# Patient Record
Sex: MALE | Race: WHITE | NOT HISPANIC OR LATINO | Employment: FULL TIME | URBAN - METROPOLITAN AREA
[De-identification: names, ages, dates, MRNs, and addresses within clinical notes are randomized per-mention and may not be internally consistent; named-entity substitution may affect disease eponyms.]

---

## 2017-09-05 ENCOUNTER — OFFICE VISIT (OUTPATIENT)
Dept: UROLOGY | Facility: CLINIC | Age: 42
End: 2017-09-05
Payer: COMMERCIAL

## 2017-09-05 VITALS
DIASTOLIC BLOOD PRESSURE: 74 MMHG | HEART RATE: 101 BPM | HEIGHT: 71 IN | BODY MASS INDEX: 39.16 KG/M2 | SYSTOLIC BLOOD PRESSURE: 140 MMHG | WEIGHT: 279.75 LBS

## 2017-09-05 DIAGNOSIS — C60.9 PENILE CANCER: Primary | ICD-10-CM

## 2017-09-05 PROCEDURE — 3008F BODY MASS INDEX DOCD: CPT | Mod: S$GLB,,, | Performed by: UROLOGY

## 2017-09-05 PROCEDURE — 99999 PR PBB SHADOW E&M-NEW PATIENT-LVL III: CPT | Mod: PBBFAC,,, | Performed by: UROLOGY

## 2017-09-05 PROCEDURE — 99204 OFFICE O/P NEW MOD 45 MIN: CPT | Mod: S$GLB,,, | Performed by: UROLOGY

## 2017-09-05 RX ORDER — MULTIVITAMIN
1 TABLET ORAL DAILY
COMMUNITY

## 2017-09-05 NOTE — PROGRESS NOTES
"Clinic Note  9/5/2017      Subjective:         Chief Complaint:   HPI  Aakash Deshpadne is a 41 y.o. male recently diagnosed with penile cancer. Consult from dr. Funk.  Patient had circumcision and penile sparing resection on 8/23/2017 by Dr. Funk.  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 Surgical margin positive for dysplasia but negative for carcinoma. Stage T1a vs T2.  Here with his wife Maria Guadalupe. Works as a . 3 kids, 10,13,16.  Patient relates that he had a "wart" growing for a year and went to get it checked.      No past medical history on file.  No family history on file.  Social History     Social History    Marital status:      Spouse name: N/A    Number of children: N/A    Years of education: N/A     Occupational History    Not on file.     Social History Main Topics    Smoking status: Not on file    Smokeless tobacco: Not on file    Alcohol use Not on file    Drug use: Unknown    Sexual activity: Not on file     Other Topics Concern    Not on file     Social History Narrative    No narrative on file     No past surgical history on file.  Patient Active Problem List   Diagnosis    Penile cancer     Review of Systems   Constitutional: Negative for appetite change, chills, fatigue, fever and unexpected weight change.   HENT: Negative for nosebleeds.    Respiratory: Negative for shortness of breath and wheezing.    Cardiovascular: Negative for chest pain, palpitations and leg swelling.   Gastrointestinal: Negative for abdominal distention, abdominal pain, constipation, diarrhea, nausea and vomiting.   Genitourinary: Negative for difficulty urinating and hematuria.   Musculoskeletal: Negative for arthralgias and back pain.   Skin: Negative for pallor.   Neurological: Negative for dizziness, seizures and syncope.   Hematological: Negative for adenopathy.   Psychiatric/Behavioral: Negative for dysphoric mood.         Objective:      There were " no vitals taken for this visit.  There is no height or weight on file to calculate BMI.  Physical Exam   Constitutional: He is oriented to person, place, and time. He appears well-developed and well-nourished. No distress.   HENT:   Head: Atraumatic.   Neck: No tracheal deviation present.   Cardiovascular: Normal rate.    Pulmonary/Chest: Effort normal. No respiratory distress. He has no wheezes.   Abdominal: Soft. Bowel sounds are normal. He exhibits no distension and no mass. There is no tenderness. There is no rebound and no guarding.   Genitourinary:         Neurological: He is alert and oriented to person, place, and time.   Skin: Skin is warm and dry. He is not diaphoretic.     Psychiatric: He has a normal mood and affect. His behavior is normal. Judgment and thought content normal.         Assessment and Plan:           Problem List Items Addressed This Visit     Penile cancer - Primary      Other Visit Diagnoses    None.         Follow up:   Will need slides and blocks to better clarify his tumor stage and grade.  Discussed possible need for partial penectomy and  inguinal lymph node dissection.  Will need results from CXR and CT scan. I would like to get CT images to review.  Letter to Dr. Funk.    Gray Yousif

## 2017-09-07 PROCEDURE — 88321 CONSLTJ&REPRT SLD PREP ELSWR: CPT | Mod: ,,, | Performed by: PATHOLOGY

## 2017-09-13 ENCOUNTER — TELEPHONE (OUTPATIENT)
Dept: UROLOGY | Facility: CLINIC | Age: 42
End: 2017-09-13

## 2017-09-13 NOTE — TELEPHONE ENCOUNTER
----- Message from Gray Yousif MD sent at 9/13/2017  9:04 AM CDT -----  Katie    We have his path review completed. We still need his actual CT scan images to review and then we can have follow up visit.

## 2017-09-13 NOTE — TELEPHONE ENCOUNTER
I spoke with patient who agreed to appt date and time, patient said he will bring ct disc to his appt.

## 2017-09-19 ENCOUNTER — OFFICE VISIT (OUTPATIENT)
Dept: UROLOGY | Facility: CLINIC | Age: 42
End: 2017-09-19
Payer: COMMERCIAL

## 2017-09-19 VITALS
BODY MASS INDEX: 39.76 KG/M2 | WEIGHT: 284 LBS | DIASTOLIC BLOOD PRESSURE: 73 MMHG | HEIGHT: 71 IN | SYSTOLIC BLOOD PRESSURE: 144 MMHG | RESPIRATION RATE: 15 BRPM | HEART RATE: 88 BPM

## 2017-09-19 DIAGNOSIS — C60.9 PENILE CANCER: Primary | ICD-10-CM

## 2017-09-19 PROCEDURE — 3008F BODY MASS INDEX DOCD: CPT | Mod: S$GLB,,, | Performed by: UROLOGY

## 2017-09-19 PROCEDURE — 99214 OFFICE O/P EST MOD 30 MIN: CPT | Mod: S$GLB,,, | Performed by: UROLOGY

## 2017-09-19 PROCEDURE — 99999 PR PBB SHADOW E&M-EST. PATIENT-LVL III: CPT | Mod: PBBFAC,,, | Performed by: UROLOGY

## 2017-09-19 NOTE — LETTER
September 19, 2017        Juan Funk MD  [address]  [place] [address]             [facility]  [address]  [place] [address]  Phone: [phone]   Patient: Aakash Deshpande   MR Number: [id number]   YOB: 1975   Date of Visit: 9/19/2017       Dear Dr. Funk:    Thank you for referring Aakash Deshpande to me for evaluation. Attached you will find relevant portions of my assessment and plan of care.    If you have questions, please do not hesitate to call me. I look forward to following Aakash Deshpande along with you.    Sincerely,      Gray Yousif MD            CC  No Recipients    Enclosure

## 2017-09-19 NOTE — PROGRESS NOTES
"Clinic Note  9/19/2017      Subjective:         Chief Complaint:   HPI  Aakash Deshpande is a 41 y.o. male recently diagnosed with penile cancer. Consult from dr. Funk.  Patient had circumcision and penile sparing resection on 8/23/2017 by Dr. Funk.  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 Surgical margin positive for dysplasia but negative for carcinoma. Stage T1a vs T2.  Here with his wife Maria Guadalupe. Works as a . 3 kids, 10,13,16.  Patient relates that he had a "wart" growing for a year and went to get it checked.  Reviewed CT scan from 8/23/2017. No inguinal or pelvic lymphadenopathy.  [facility] slides [facility] multidisciplinary conference.  Stage T1a moderate differentiated 2/4, no lvi.       No past medical history on file.  Family History   Problem Relation Age of Onset    Hypertension Father     Diabetes Mother     Thyroid nodules Mother      Social History     Social History    Marital status:      Spouse name: N/A    Number of children: N/A    Years of education: N/A     Occupational History    Not on file.     Social History Main Topics    Smoking status: Former Smoker    Smokeless tobacco: Current User     Types: Chew    Alcohol use Yes    Drug use: No    Sexual activity: Not on file     Other Topics Concern    Not on file     Social History Narrative    No narrative on file     Past Surgical History:   Procedure Laterality Date    BACK SURGERY  03/2016     Patient Active Problem List   Diagnosis    Penile cancer     Review of Systems   Constitutional: Negative for appetite change, chills, fatigue, fever and unexpected weight change.   HENT: Negative for nosebleeds.    Respiratory: Negative for shortness of breath and wheezing.    Cardiovascular: Negative for chest pain, palpitations and leg swelling.   Gastrointestinal: Negative for abdominal distention, abdominal pain, constipation, diarrhea, nausea and vomiting. " "  Genitourinary: Negative for difficulty urinating.   Musculoskeletal: Negative for arthralgias and back pain.   Skin: Negative for pallor.   Neurological: Negative for dizziness, seizures and syncope.   Hematological: Negative for adenopathy.   Psychiatric/Behavioral: Negative for dysphoric mood.         Objective:      BP (!) 144/73   Pulse 88   Resp 15   Ht 5' 11" (1.803 m)   Wt 128.8 kg (284 lb)   BMI 39.61 kg/m²   Estimated body mass index is 39.61 kg/m² as calculated from the following:    Height as of this encounter: 5' 11" (1.803 m).    Weight as of this encounter: 128.8 kg (284 lb).  Physical Exam   Constitutional: He is oriented to person, place, and time. He appears well-developed and well-nourished. No distress.   HENT:   Head: Atraumatic.   Neck: No tracheal deviation present.   Cardiovascular: Normal rate.    Pulmonary/Chest: Effort normal. No respiratory distress. He has no wheezes.   Abdominal: Soft. Bowel sounds are normal. He exhibits no distension and no mass. There is no tenderness. There is no rebound and no guarding.   Genitourinary: Circumcised.   Genitourinary Comments: No inguinal nodes, no suspicious lesions on penis.   Neurological: He is alert and oriented to person, place, and time.   Skin: Skin is warm and dry. He is not diaphoretic.     Psychiatric: He has a normal mood and affect. His behavior is normal. Judgment and thought content normal.         Assessment and Plan:           Problem List Items Addressed This Visit     Penile cancer - Primary      Other Visit Diagnoses    None.         Follow up:   Discussed options. NCCN guidelines- close surveillance with physical exams .  Recommend repeat physical exam in 2 months with Dr. Funk. Then every 3 months for 2 years. Due to his obesity will need CT scan to assess nodes.  Letter to Dr. Funk.      Gray Yousif      "

## 2017-12-14 ENCOUNTER — TELEPHONE (OUTPATIENT)
Dept: UROLOGY | Facility: CLINIC | Age: 42
End: 2017-12-14

## 2017-12-20 DIAGNOSIS — C60.9 PENILE CANCER: Primary | ICD-10-CM

## 2018-01-09 ENCOUNTER — OFFICE VISIT (OUTPATIENT)
Dept: UROLOGY | Facility: CLINIC | Age: 43
End: 2018-01-09
Payer: COMMERCIAL

## 2018-01-09 ENCOUNTER — HOSPITAL ENCOUNTER (OUTPATIENT)
Dept: RADIOLOGY | Facility: HOSPITAL | Age: 43
Discharge: HOME | End: 2018-01-09
Attending: UROLOGY
Payer: COMMERCIAL

## 2018-01-09 VITALS
SYSTOLIC BLOOD PRESSURE: 137 MMHG | WEIGHT: 284 LBS | HEART RATE: 87 BPM | DIASTOLIC BLOOD PRESSURE: 71 MMHG | RESPIRATION RATE: 15 BRPM | BODY MASS INDEX: 39.76 KG/M2 | HEIGHT: 71 IN

## 2018-01-09 DIAGNOSIS — C60.9 PENILE CANCER: Primary | ICD-10-CM

## 2018-01-09 DIAGNOSIS — C60.9 PENILE CANCER: ICD-10-CM

## 2018-01-09 PROCEDURE — 74178 CT ABD&PLV WO CNTR FLWD CNTR: CPT | Mod: 26,,, | Performed by: RADIOLOGY

## 2018-01-09 PROCEDURE — 99999 PR PBB SHADOW E&M-EST. PATIENT-LVL III: CPT | Mod: PBBFAC,,, | Performed by: UROLOGY

## 2018-01-09 PROCEDURE — 99214 OFFICE O/P EST MOD 30 MIN: CPT | Mod: S$GLB,,, | Performed by: UROLOGY

## 2018-01-09 PROCEDURE — 74178 CT ABD&PLV WO CNTR FLWD CNTR: CPT | Mod: TC

## 2018-01-09 PROCEDURE — 25500020 PHARM REV CODE 255: Performed by: UROLOGY

## 2018-01-09 RX ADMIN — IOHEXOL 100 ML: 350 INJECTION, SOLUTION INTRAVENOUS at 10:01

## 2018-01-09 RX ADMIN — IOHEXOL 15 ML: 350 INJECTION, SOLUTION INTRAVENOUS at 08:01

## 2018-01-09 NOTE — PROGRESS NOTES
"Clinic Note  1/9/2018      Subjective:         Chief Complaint:   HPI  Aakash Deshpande is a 42 y.o. male recently diagnosed with penile cancer. Consult from dr. Funk.  Patient had circumcision and penile sparing resection on 8/23/2017 by Dr. Funk.  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 Surgical margin positive for dysplasia but negative for carcinoma. Stage T1a vs T2.  Here with his wife Maria Guadalupe. Works as a . 3 kids, 10,13,16.  Patient relates that he had a "wart" growing for a year and went to get it checked.  Reviewed CT scan from 8/23/2017. No inguinal or pelvic lymphadenopathy.  Path slides reviewed at [facility] multidisciplinary conference.  Stage T1a moderate differentiated 2/4, no lvi.    CT scan today- no inguinal or pelvic nodes to my review or radiology review.    No past medical history on file.  Family History   Problem Relation Age of Onset    Hypertension Father     Diabetes Mother     Thyroid nodules Mother      Social History     Social History    Marital status:      Spouse name: N/A    Number of children: N/A    Years of education: N/A     Occupational History    Not on file.     Social History Main Topics    Smoking status: Former Smoker    Smokeless tobacco: Current User     Types: Chew    Alcohol use Yes    Drug use: No    Sexual activity: Not on file     Other Topics Concern    Not on file     Social History Narrative    No narrative on file     Past Surgical History:   Procedure Laterality Date    BACK SURGERY  03/2016     Patient Active Problem List   Diagnosis    Penile cancer     Review of Systems   Constitutional: Negative for appetite change, chills, fatigue, fever and unexpected weight change.   HENT: Negative for nosebleeds.    Respiratory: Negative for shortness of breath and wheezing.    Cardiovascular: Negative for chest pain, palpitations and leg swelling.   Gastrointestinal: Negative for abdominal " "distention, abdominal pain, constipation, diarrhea, nausea and vomiting.   Genitourinary: Negative for dysuria and hematuria.   Musculoskeletal: Negative for arthralgias and back pain.   Skin: Negative for pallor.   Neurological: Negative for dizziness, seizures and syncope.   Hematological: Negative for adenopathy.   Psychiatric/Behavioral: Negative for dysphoric mood.         Objective:      /71   Pulse 87   Resp 15   Ht 5' 11" (1.803 m)   Wt 128.8 kg (284 lb)   BMI 39.61 kg/m²   Estimated body mass index is 39.61 kg/m² as calculated from the following:    Height as of this encounter: 5' 11" (1.803 m).    Weight as of this encounter: 128.8 kg (284 lb).  Physical Exam   Constitutional: He is oriented to person, place, and time. He appears well-developed and well-nourished. No distress.   HENT:   Head: Atraumatic.   Neck: No tracheal deviation present.   Cardiovascular: Normal rate.    Pulmonary/Chest: Effort normal. No respiratory distress. He has no wheezes.   Abdominal: Soft. Bowel sounds are normal. He exhibits no distension and no mass. There is no tenderness. There is no rebound and no guarding.   Genitourinary:         Lymphadenopathy: No inguinal adenopathy noted on the right or left side.   Neurological: He is alert and oriented to person, place, and time.   Skin: Skin is warm and dry. He is not diaphoretic.     Psychiatric: He has a normal mood and affect. His behavior is normal. Judgment and thought content normal.         Assessment and Plan:   Negative nodes on CT and physical exam. Will schedule biopsy of penile lesion under anesthetic.        Problem List Items Addressed This Visit     Penile cancer - Primary          Follow up:       Gray Yousif      "

## 2018-01-22 ENCOUNTER — TELEPHONE (OUTPATIENT)
Dept: UROLOGY | Facility: CLINIC | Age: 43
End: 2018-01-22

## 2018-01-22 DIAGNOSIS — C60.9 PENILE CANCER: Primary | ICD-10-CM

## 2018-01-25 ENCOUNTER — ANESTHESIA EVENT (OUTPATIENT)
Dept: SURGERY | Facility: HOSPITAL | Age: 43
End: 2018-01-25
Payer: COMMERCIAL

## 2018-01-25 NOTE — ANESTHESIA PREPROCEDURE EVALUATION
Anesthesia Assessment: Preoperative EQUATION     Planned Procedure: Procedure(s) (LRB):  BIOPSY-PENIS (N/A)  Requested Anesthesia Type:Local MAC  Surgeon: Gray Yousif MD  Service: Urology  Known or anticipated Date of Surgery:2/5/2018     Surgeon notes: reviewed     Electronic QUestionnaire Assessment completed via nurse interview with patient.      NO AQ     Triage considerations:      The patient has no apparent active cardiac condition (No unstable coronary Syndrome such as severe unstable angina or recent [<1 month] myocardial infarction, decompensated CHF, severe valvular   disease or significant arrhythmia)     Previous anesthesia records:GETA, No problems and Not available     Other important co-morbidities:  HX Back surgery, Chews tobacco     Tests already available:  Available tests,  within 1 month , within [facility] .  1/2018 CMP                            Instructions given. (See in Nurse's note)     Optimization:  Anesthesia Preop Clinic Assessment  Indicated-not required for this surgery                Plan:    Testing:  none needed                           Patient  has previously scheduled Medical Appointment: none     Navigation: Tests Scheduled. none                        Straight Line to surgery.                          No tests, anesthesia preop clinic visit, or consult required.      Kelsey Weeks RN                                                 Electronically signed by Kelsey Weeks RN at 1/25/2018 12:56 PM                                                                                                                   01/25/2018  Aakash Deshpande is a 42 y.o., male.    Anesthesia Evaluation    I have reviewed the Patient Summary Reports.    I have reviewed the Nursing Notes.   I have reviewed the Medications.     Review of Systems  Anesthesia Hx:  No problems with previous Anesthesia  History of prior surgery of interest to airway management or planning: Denies Family Hx of  Anesthesia complications.   Denies Personal Hx of Anesthesia complications.   Social:  Chews tobacco   Hematology/Oncology:  Hematology Normal   Oncology Normal     EENT/Dental:EENT/Dental Normal   Cardiovascular:  Cardiovascular Normal     Pulmonary:   Snores, has never been evaluated for sleep apnea   Hepatic/GI:  Hepatic/GI Normal    Neurological:  Neurology Normal    Endocrine:  Endocrine Normal        Physical Exam  General:  Well nourished, Morbid Obesity    Airway/Jaw/Neck:  Airway Findings: Mouth Opening: < 3 cm Tongue: Large  General Airway Assessment: Adult, Possible difficult intubation  Mallampati: IV  Improves to III with phonation.  TM Distance: 4 - 6 cm  Jaw/Neck Findings:  Neck ROM: Normal ROM      Dental:  Dental Findings: In tact   Chest/Lungs:  Chest/Lungs Findings: Clear to auscultation, Normal Respiratory Rate     Heart/Vascular:  Heart Findings: Rate: Normal  Rhythm: Regular Rhythm  Sounds: Normal        Mental Status:  Mental Status Findings:  Cooperative, Alert and Oriented         Anesthesia Plan  Type of Anesthesia, risks & benefits discussed:  Anesthesia Type:  general  Patient's Preference:   Intra-op Monitoring Plan:   Intra-op Monitoring Plan Comments:   Post Op Pain Control Plan:   Post Op Pain Control Plan Comments:   Induction:   IV  Beta Blocker:  Patient is not currently on a Beta-Blocker (No further documentation required).       Informed Consent: Patient understands risks and agrees with Anesthesia plan.  Questions answered. Anesthesia consent signed with patient.  ASA Score: 3     Day of Surgery Review of History & Physical:    H&P update referred to the surgeon.         Ready For Surgery From Anesthesia Perspective.

## 2018-01-25 NOTE — PRE ADMISSION SCREENING
Anesthesia Assessment: Preoperative EQUATION    Planned Procedure: Procedure(s) (LRB):  BIOPSY-PENIS (N/A)  Requested Anesthesia Type:Local MAC  Surgeon: Gray Yousif MD  Service: Urology  Known or anticipated Date of Surgery:2/5/2018    Surgeon notes: reviewed    Electronic QUestionnaire Assessment completed via nurse interview with patient.      NO AQ    Triage considerations:     The patient has no apparent active cardiac condition (No unstable coronary Syndrome such as severe unstable angina or recent [<1 month] myocardial infarction, decompensated CHF, severe valvular   disease or significant arrhythmia)    Previous anesthesia records:GETA, No problems and Not available    Other important co-morbidities:  HX Back surgery, Chews tobacco     Tests already available:  Available tests,  within 1 month , [facility] .  1/2018 CMP            Instructions given. (See in Nurse's note)    Optimization:  Anesthesia Preop Clinic Assessment  Indicated-not required for this surgery        Plan:    Testing:  none needed     Patient  has previously scheduled Medical Appointment: none    Navigation: Tests Scheduled. none             Straight Line to surgery.               No tests, anesthesia preop clinic visit, or consult required.     Kelsey Weeks RN

## 2018-02-02 ENCOUNTER — TELEPHONE (OUTPATIENT)
Dept: UROLOGY | Facility: CLINIC | Age: 43
End: 2018-02-02

## 2018-02-05 ENCOUNTER — ANESTHESIA (OUTPATIENT)
Dept: SURGERY | Facility: HOSPITAL | Age: 43
End: 2018-02-05
Payer: COMMERCIAL

## 2018-02-05 ENCOUNTER — HOSPITAL ENCOUNTER (OUTPATIENT)
Facility: HOSPITAL | Age: 43
Discharge: HOME | End: 2018-02-05
Attending: UROLOGY | Admitting: UROLOGY
Payer: COMMERCIAL

## 2018-02-05 VITALS
TEMPERATURE: 98 F | HEART RATE: 77 BPM | BODY MASS INDEX: 38.19 KG/M2 | SYSTOLIC BLOOD PRESSURE: 113 MMHG | OXYGEN SATURATION: 98 % | RESPIRATION RATE: 16 BRPM | HEIGHT: 72 IN | DIASTOLIC BLOOD PRESSURE: 69 MMHG | WEIGHT: 282 LBS

## 2018-02-05 DIAGNOSIS — C60.9 PENILE CANCER: ICD-10-CM

## 2018-02-05 PROCEDURE — 37000008 HC ANESTHESIA 1ST 15 MINUTES: Performed by: UROLOGY

## 2018-02-05 PROCEDURE — 71000016 HC POSTOP RECOV ADDL HR: Performed by: UROLOGY

## 2018-02-05 PROCEDURE — 71000033 HC RECOVERY, INTIAL HOUR: Performed by: UROLOGY

## 2018-02-05 PROCEDURE — 36000707: Performed by: UROLOGY

## 2018-02-05 PROCEDURE — 25000003 PHARM REV CODE 250: Performed by: NURSE ANESTHETIST, CERTIFIED REGISTERED

## 2018-02-05 PROCEDURE — 88305 TISSUE EXAM BY PATHOLOGIST: CPT | Mod: 26,,, | Performed by: PATHOLOGY

## 2018-02-05 PROCEDURE — 54100 BIOPSY OF PENIS: CPT | Mod: ,,, | Performed by: UROLOGY

## 2018-02-05 PROCEDURE — D9220A PRA ANESTHESIA: Mod: CRNA,,, | Performed by: NURSE ANESTHETIST, CERTIFIED REGISTERED

## 2018-02-05 PROCEDURE — 63600175 PHARM REV CODE 636 W HCPCS: Performed by: NURSE ANESTHETIST, CERTIFIED REGISTERED

## 2018-02-05 PROCEDURE — 71000015 HC POSTOP RECOV 1ST HR: Performed by: UROLOGY

## 2018-02-05 PROCEDURE — D9220A PRA ANESTHESIA: Mod: ANES,,, | Performed by: ANESTHESIOLOGY

## 2018-02-05 PROCEDURE — 63600175 PHARM REV CODE 636 W HCPCS: Performed by: STUDENT IN AN ORGANIZED HEALTH CARE EDUCATION/TRAINING PROGRAM

## 2018-02-05 PROCEDURE — 25000003 PHARM REV CODE 250: Performed by: UROLOGY

## 2018-02-05 PROCEDURE — 37000009 HC ANESTHESIA EA ADD 15 MINS: Performed by: UROLOGY

## 2018-02-05 PROCEDURE — 36000706: Performed by: UROLOGY

## 2018-02-05 PROCEDURE — 25000003 PHARM REV CODE 250: Performed by: EMERGENCY MEDICINE

## 2018-02-05 PROCEDURE — 88305 TISSUE EXAM BY PATHOLOGIST: CPT | Performed by: PATHOLOGY

## 2018-02-05 RX ORDER — MIDAZOLAM HYDROCHLORIDE 1 MG/ML
INJECTION, SOLUTION INTRAMUSCULAR; INTRAVENOUS
Status: DISCONTINUED | OUTPATIENT
Start: 2018-02-05 | End: 2018-02-05

## 2018-02-05 RX ORDER — SODIUM CHLORIDE 0.9 % (FLUSH) 0.9 %
3 SYRINGE (ML) INJECTION
Status: DISCONTINUED | OUTPATIENT
Start: 2018-02-05 | End: 2018-02-05 | Stop reason: HOSPADM

## 2018-02-05 RX ORDER — HYDROCODONE BITARTRATE AND ACETAMINOPHEN 5; 325 MG/1; MG/1
1 TABLET ORAL EVERY 6 HOURS PRN
Status: DISCONTINUED | OUTPATIENT
Start: 2018-02-05 | End: 2018-02-05 | Stop reason: HOSPADM

## 2018-02-05 RX ORDER — FENTANYL CITRATE 50 UG/ML
25 INJECTION, SOLUTION INTRAMUSCULAR; INTRAVENOUS EVERY 5 MIN PRN
Status: DISCONTINUED | OUTPATIENT
Start: 2018-02-05 | End: 2018-02-05 | Stop reason: HOSPADM

## 2018-02-05 RX ORDER — HYDROCODONE BITARTRATE AND ACETAMINOPHEN 5; 325 MG/1; MG/1
TABLET ORAL
Status: DISCONTINUED
Start: 2018-02-05 | End: 2018-02-05 | Stop reason: HOSPADM

## 2018-02-05 RX ORDER — PROPOFOL 10 MG/ML
VIAL (ML) INTRAVENOUS CONTINUOUS PRN
Status: DISCONTINUED | OUTPATIENT
Start: 2018-02-05 | End: 2018-02-05

## 2018-02-05 RX ORDER — HYDROCODONE BITARTRATE AND ACETAMINOPHEN 5; 325 MG/1; MG/1
1 TABLET ORAL EVERY 6 HOURS PRN
Qty: 31 TABLET | Refills: 0 | Status: SHIPPED | OUTPATIENT
Start: 2018-02-05 | End: 2018-02-15

## 2018-02-05 RX ORDER — POLYETHYLENE GLYCOL 3350 17 G/17G
17 POWDER, FOR SOLUTION ORAL DAILY
Qty: 30 PACKET | Refills: 0 | Status: SHIPPED | OUTPATIENT
Start: 2018-02-05 | End: 2018-05-01

## 2018-02-05 RX ORDER — LIDOCAINE HYDROCHLORIDE 10 MG/ML
INJECTION INFILTRATION; PERINEURAL
Status: DISCONTINUED
Start: 2018-02-05 | End: 2018-02-05 | Stop reason: HOSPADM

## 2018-02-05 RX ORDER — CEFAZOLIN SODIUM 1 G/3ML
2 INJECTION, POWDER, FOR SOLUTION INTRAMUSCULAR; INTRAVENOUS
Status: COMPLETED | OUTPATIENT
Start: 2018-02-05 | End: 2018-02-05

## 2018-02-05 RX ORDER — SODIUM CHLORIDE 9 MG/ML
INJECTION, SOLUTION INTRAVENOUS CONTINUOUS
Status: DISCONTINUED | OUTPATIENT
Start: 2018-02-05 | End: 2018-02-05 | Stop reason: HOSPADM

## 2018-02-05 RX ORDER — SODIUM CHLORIDE 9 MG/ML
INJECTION, SOLUTION INTRAVENOUS CONTINUOUS PRN
Status: DISCONTINUED | OUTPATIENT
Start: 2018-02-05 | End: 2018-02-05

## 2018-02-05 RX ORDER — LIDOCAINE HYDROCHLORIDE 10 MG/ML
INJECTION, SOLUTION INTRAVENOUS
Status: DISCONTINUED | OUTPATIENT
Start: 2018-02-05 | End: 2018-02-05

## 2018-02-05 RX ORDER — OXYCODONE AND ACETAMINOPHEN 5; 325 MG/1; MG/1
1 TABLET ORAL EVERY 4 HOURS PRN
Qty: 31 TABLET | Refills: 0 | Status: SHIPPED | OUTPATIENT
Start: 2018-02-05 | End: 2018-02-27

## 2018-02-05 RX ORDER — FENTANYL CITRATE 50 UG/ML
INJECTION, SOLUTION INTRAMUSCULAR; INTRAVENOUS
Status: DISCONTINUED | OUTPATIENT
Start: 2018-02-05 | End: 2018-02-05

## 2018-02-05 RX ORDER — LIDOCAINE HYDROCHLORIDE 10 MG/ML
INJECTION, SOLUTION EPIDURAL; INFILTRATION; INTRACAUDAL; PERINEURAL
Status: DISCONTINUED | OUTPATIENT
Start: 2018-02-05 | End: 2018-02-05 | Stop reason: HOSPADM

## 2018-02-05 RX ADMIN — CEFAZOLIN 2 G: 330 INJECTION, POWDER, FOR SOLUTION INTRAMUSCULAR; INTRAVENOUS at 11:02

## 2018-02-05 RX ADMIN — FENTANYL CITRATE 50 MCG: 50 INJECTION, SOLUTION INTRAMUSCULAR; INTRAVENOUS at 11:02

## 2018-02-05 RX ADMIN — MIDAZOLAM HYDROCHLORIDE 2 MG: 1 INJECTION, SOLUTION INTRAMUSCULAR; INTRAVENOUS at 11:02

## 2018-02-05 RX ADMIN — LIDOCAINE HYDROCHLORIDE 100 MG: 10 INJECTION, SOLUTION INTRAVENOUS at 11:02

## 2018-02-05 RX ADMIN — HYDROCODONE BITARTRATE AND ACETAMINOPHEN 1 TABLET: 5; 325 TABLET ORAL at 12:02

## 2018-02-05 RX ADMIN — PROPOFOL 175 MCG/KG/MIN: 10 INJECTION, EMULSION INTRAVENOUS at 11:02

## 2018-02-05 RX ADMIN — SODIUM CHLORIDE: 0.9 INJECTION, SOLUTION INTRAVENOUS at 11:02

## 2018-02-05 NOTE — INTERVAL H&P NOTE
The patient has been examined and the H&P has been reviewed:    I concur with the findings and no changes have occurred since H&P was written.    Anesthesia/Surgery risks, benefits and alternative options discussed and understood by patient/family.          Active Hospital Problems    Diagnosis  POA    Penile cancer [C60.9]  Yes      Resolved Hospital Problems    Diagnosis Date Resolved POA   No resolved problems to display.

## 2018-02-05 NOTE — TRANSFER OF CARE
"Anesthesia Transfer of Care Note    Patient: Aakash Deshpande    Procedure(s) Performed: Procedure(s) (LRB):  BIOPSY-PENIS (N/A)    Patient location: PACU    Anesthesia Type: MAC    Transport from OR: Transported from OR on room air with adequate spontaneous ventilation. Transported from OR on 6-10 L/min O2 by face mask with adequate spontaneous ventilation    Post pain: adequate analgesia    Post assessment: no apparent anesthetic complications and tolerated procedure well    Post vital signs: stable    Level of consciousness: awake and alert    Nausea/Vomiting: no nausea/vomiting    Complications: none    Transfer of care protocol was followed      Last vitals:   Visit Vitals  /73 (BP Location: Right arm, Patient Position: Lying)   Pulse 75   Temp 36.7 °C (98 °F) (Oral)   Resp 20   Ht 5' 11.5" (1.816 m)   Wt 127.9 kg (282 lb)   SpO2 98%   BMI 38.78 kg/m²     "

## 2018-02-05 NOTE — BRIEF OP NOTE
[facility]  Brief Operative Note     SUMMARY     Surgery Date: 2/5/2018     Surgeon(s) and Role:     * Gray Yousif MD - Primary     * Gray Haynes MD - Resident - Assisting        Pre-op Diagnosis:  Penile cancer [C60.9]    Post-op Diagnosis:  Post-Op Diagnosis Codes:     * Penile cancer [C60.9]    Procedure(s) (LRB):  BIOPSY-PENIS (N/A)    Anesthesia: Local MAC    Description of the findings of the procedure: see op note    Findings/Key Components: see op note    Estimated Blood Loss: * No values recorded between 2/5/2018 11:33 AM and 2/5/2018 11:53 AM *         Specimens:   Specimen (12h ago through future)    Start     Ordered    02/05/18 1144  Specimen to Pathology - Surgery  Once     Comments:  1. Penile biopsy -- silk right, chromic left -- Perm.      02/05/18 1143          Discharge Note    SUMMARY     Admit Date: 2/5/2018    Discharge Date and Time:  02/05/2018 11:58 AM    Hospital Course (synopsis of major diagnoses, care, treatment, and services provided during the course of the hospital stay): Patient admitted for penile biopsy. Tolerated the procedure well. Discharged home in stable condition.      Final Diagnosis: Post-Op Diagnosis Codes:     * Penile cancer [C60.9]    Disposition: Home or Self Care    Follow Up/Patient Instructions:     Medications:  Reconciled Home Medications:   Current Discharge Medication List      START taking these medications    Details   oxyCODONE-acetaminophen (PERCOCET) 5-325 mg per tablet Take 1 tablet by mouth every 4 (four) hours as needed.  Qty: 31 tablet, Refills: 0      polyethylene glycol (GLYCOLAX) 17 gram PwPk Take 17 g by mouth once daily.  Qty: 30 packet, Refills: 0         CONTINUE these medications which have NOT CHANGED    Details   multivitamin (ONE DAILY MULTIVITAMIN) per tablet Take 1 tablet by mouth once daily.             Discharge Procedure Orders  Diet Adult Regular     Other restrictions (specify):   Order Comments:  Refrain from sexual intercourse or masturbation for 6 weeks.     Notify your health care provider if you experience any of the following:  temperature >100.4     Notify your health care provider if you experience any of the following:  persistent nausea and vomiting or diarrhea     Notify your health care provider if you experience any of the following:  severe uncontrolled pain     Notify your health care provider if you experience any of the following:  redness, tenderness, or signs of infection (pain, swelling, redness, odor or green/yellow discharge around incision site)     Notify your health care provider if you experience any of the following:  difficulty breathing or increased cough     Notify your health care provider if you experience any of the following:  severe persistent headache     Notify your health care provider if you experience any of the following:  worsening rash     Notify your health care provider if you experience any of the following:  persistent dizziness, light-headedness, or visual disturbances     Notify your health care provider if you experience any of the following:  increased confusion or weakness     No dressing needed   Order Comments: May shower. Do not soak wounds in water or take a bath for at least 2 weeks.       Follow-up Information     Gray Yousif MD In 2 weeks.    Specialty:  Urology  Why:  post op follow up.   Contact information:  [address]  [facility] [id number] [phone]

## 2018-02-05 NOTE — DISCHARGE SUMMARY
[facility]  Discharge Note  Short Stay    Admit Date: 2/5/2018    Discharge Date and Time: 2/5/18     Attending Physician: Gray Yousif MD     Discharge Provider: Gray Haynes    Diagnoses:  Active Hospital Problems    Diagnosis  POA    *Penile cancer [C60.9]  Yes      Resolved Hospital Problems    Diagnosis Date Resolved POA   No resolved problems to display.       Discharged Condition: good    Hospital Course: Patient was admitted for an outpatient procedure and tolerated the procedure well with no complications.    Final Diagnoses: Same as principal problem.    Disposition: Home or Self Care    Follow up/Patient Instructions:    Medications:  Reconciled Home Medications:   Current Discharge Medication List      START taking these medications    Details   oxyCODONE-acetaminophen (PERCOCET) 5-325 mg per tablet Take 1 tablet by mouth every 4 (four) hours as needed.  Qty: 31 tablet, Refills: 0      polyethylene glycol (GLYCOLAX) 17 gram PwPk Take 17 g by mouth once daily.  Qty: 30 packet, Refills: 0         CONTINUE these medications which have NOT CHANGED    Details   multivitamin (ONE DAILY MULTIVITAMIN) per tablet Take 1 tablet by mouth once daily.             Discharge Procedure Orders  Diet Adult Regular     Other restrictions (specify):   Order Comments: Refrain from sexual intercourse or masturbation for 6 weeks.     Notify your health care provider if you experience any of the following:  temperature >100.4     Notify your health care provider if you experience any of the following:  persistent nausea and vomiting or diarrhea     Notify your health care provider if you experience any of the following:  severe uncontrolled pain     Notify your health care provider if you experience any of the following:  redness, tenderness, or signs of infection (pain, swelling, redness, odor or green/yellow discharge around incision site)     Notify your health care provider if you experience any of  the following:  difficulty breathing or increased cough     Notify your health care provider if you experience any of the following:  severe persistent headache     Notify your health care provider if you experience any of the following:  worsening rash     Notify your health care provider if you experience any of the following:  persistent dizziness, light-headedness, or visual disturbances     Notify your health care provider if you experience any of the following:  increased confusion or weakness     No dressing needed   Order Comments: May shower. Do not soak wounds in water or take a bath for at least 2 weeks.       Follow-up Information     Gray Yousif MD In 2 weeks.    Specialty:  Urology  Why:  post op follow up.   Contact information:  [address]  [facility] [address]  [phone]                   Discharge Procedure Orders (must include Diet, Follow-up, Activity):    Discharge Procedure Orders (must include Diet, Follow-up, Activity)  Diet Adult Regular     Other restrictions (specify):   Order Comments: Refrain from sexual intercourse or masturbation for 6 weeks.     Notify your health care provider if you experience any of the following:  temperature >100.4     Notify your health care provider if you experience any of the following:  persistent nausea and vomiting or diarrhea     Notify your health care provider if you experience any of the following:  severe uncontrolled pain     Notify your health care provider if you experience any of the following:  redness, tenderness, or signs of infection (pain, swelling, redness, odor or green/yellow discharge around incision site)     Notify your health care provider if you experience any of the following:  difficulty breathing or increased cough     Notify your health care provider if you experience any of the following:  severe persistent headache     Notify your health care provider if you experience any of the following:  worsening rash      Notify your health care provider if you experience any of the following:  persistent dizziness, light-headedness, or visual disturbances     Notify your health care provider if you experience any of the following:  increased confusion or weakness     No dressing needed   Order Comments: May shower. Do not soak wounds in water or take a bath for at least 2 weeks.

## 2018-02-05 NOTE — H&P (VIEW-ONLY)
"Clinic Note  1/9/2018      Subjective:         Chief Complaint:   HPI  Aakash Deshpande is a 42 y.o. male recently diagnosed with penile cancer. Consult from dr. Funk.  Patient had circumcision and penile sparing resection on 8/23/2017 by Dr. Funk.  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 Surgical margin positive for dysplasia but negative for carcinoma. Stage T1a vs T2.  Here with his wife Maria Guadalupe. Works as a . 3 kids, 10,13,16.  Patient relates that he had a "wart" growing for a year and went to get it checked.  Reviewed CT scan from 8/23/2017. No inguinal or pelvic lymphadenopathy.  [facility] conference.  Stage T1a moderate differentiated 2/4, no lvi.    CT scan today- no inguinal or pelvic nodes to my review or radiology review.    No past medical history on file.  Family History   Problem Relation Age of Onset    Hypertension Father     Diabetes Mother     Thyroid nodules Mother      Social History     Social History    Marital status:      Spouse name: N/A    Number of children: N/A    Years of education: N/A     Occupational History    Not on file.     Social History Main Topics    Smoking status: Former Smoker    Smokeless tobacco: Current User     Types: Chew    Alcohol use Yes    Drug use: No    Sexual activity: Not on file     Other Topics Concern    Not on file     Social History Narrative    No narrative on file     Past Surgical History:   Procedure Laterality Date    BACK SURGERY  03/2016     Patient Active Problem List   Diagnosis    Penile cancer     Review of Systems   Constitutional: Negative for appetite change, chills, fatigue, fever and unexpected weight change.   HENT: Negative for nosebleeds.    Respiratory: Negative for shortness of breath and wheezing.    Cardiovascular: Negative for chest pain, palpitations and leg swelling.   Gastrointestinal: Negative for abdominal " "distention, abdominal pain, constipation, diarrhea, nausea and vomiting.   Genitourinary: Negative for dysuria and hematuria.   Musculoskeletal: Negative for arthralgias and back pain.   Skin: Negative for pallor.   Neurological: Negative for dizziness, seizures and syncope.   Hematological: Negative for adenopathy.   Psychiatric/Behavioral: Negative for dysphoric mood.         Objective:      /71   Pulse 87   Resp 15   Ht 5' 11" (1.803 m)   Wt 128.8 kg (284 lb)   BMI 39.61 kg/m²   Estimated body mass index is 39.61 kg/m² as calculated from the following:    Height as of this encounter: 5' 11" (1.803 m).    Weight as of this encounter: 128.8 kg (284 lb).  Physical Exam   Constitutional: He is oriented to person, place, and time. He appears well-developed and well-nourished. No distress.   HENT:   Head: Atraumatic.   Neck: No tracheal deviation present.   Cardiovascular: Normal rate.    Pulmonary/Chest: Effort normal. No respiratory distress. He has no wheezes.   Abdominal: Soft. Bowel sounds are normal. He exhibits no distension and no mass. There is no tenderness. There is no rebound and no guarding.   Genitourinary:         Lymphadenopathy: No inguinal adenopathy noted on the right or left side.   Neurological: He is alert and oriented to person, place, and time.   Skin: Skin is warm and dry. He is not diaphoretic.     Psychiatric: He has a normal mood and affect. His behavior is normal. Judgment and thought content normal.         Assessment and Plan:   Negative nodes on CT and physical exam. Will schedule biopsy of penile lesion under anesthetic.        Problem List Items Addressed This Visit     Penile cancer - Primary          Follow up:       Gray Yousif      "

## 2018-02-05 NOTE — OP NOTE
[facility]  Operative Note    Date: 2/5/18    Pre-Op Diagnosis: penile cancer (T1a) with penile lesion present.     Post-Op Diagnosis: same    Procedure(s) Performed:   Excision of penile lesion    Specimen(s):   1. Penile lesion from left ventral glans       Staff Surgeon: Gray Yousif MD    Assistant Surgeon: Gray Haynes    Anesthesia: General endotracheal anesthesia    Indications: Aakash Deshpande is a 42 y.o. male male with penile cancer referred from Dr. Funk here today for penile biopsy and excision of penile lesion.     Findings: Location of penile lesion:ventral left glans at the coronal sulcus.     Estimated Blood Loss: none    Drains: none    Procedure in detail:    After the risks and benefits of the procedure were explained, informed consent was obtained. The patient was taken to the operating room and placed int he supine position.  SCDs were applied and working.  Anesthesia was administered.  The patient was shaved, prepped and draped in the usual sterile fashion. Timeout was performed and preoperative antibiotics were confirmed.      A ring block was performed using lidocaine.    Penile lesion was identified as discussed above in findings.    An ellipse was marked around the glans penile lesion. A 15 blade was used to incise this. Next, using 3-0 chromic in an interrupted fashion the skin edges were then re approximated and hemostasis was achieved.      The specimen was sent with silk marking the right side and chromic marking the left side of the specimen.    Site was dressed with xeroform and coban.     Plan:  1. D/c home  2. Percocet for pain  3. F/u with Dr. Yousif in 2 weeks    Gray Haynes

## 2018-02-05 NOTE — ANESTHESIA POSTPROCEDURE EVALUATION
"Anesthesia Post Evaluation    Patient: Aakash Deshpande    Procedure(s) Performed: Procedure(s) (LRB):  BIOPSY-PENIS (N/A)    Final Anesthesia Type: general  Patient location during evaluation: PACU  Patient participation: Yes- Able to Participate  Level of consciousness: awake and alert and awake  Post-procedure vital signs: reviewed and stable  Pain management: adequate  Airway patency: patent  PONV status at discharge: No PONV  Anesthetic complications: no      Cardiovascular status: blood pressure returned to baseline  Respiratory status: unassisted and spontaneous ventilation  Hydration status: euvolemic  Follow-up not needed.        Visit Vitals  /69   Pulse 77   Temp 36.7 °C (98.1 °F) (Temporal)   Resp 16   Ht 5' 11.5" (1.816 m)   Wt 127.9 kg (282 lb)   SpO2 98%   BMI 38.78 kg/m²       Pain/Ginger Score: Pain Assessment Performed: Yes (2/5/2018 12:57 PM)  Presence of Pain: denies (2/5/2018 12:57 PM)  Pain Rating Prior to Med Admin: 6 (2/5/2018 12:57 PM)  Pain Rating Post Med Admin: 0 (2/5/2018 12:57 PM)  Ginger Score: 10 (2/5/2018 11:56 AM)      "

## 2018-02-27 ENCOUNTER — OFFICE VISIT (OUTPATIENT)
Dept: UROLOGY | Facility: CLINIC | Age: 43
End: 2018-02-27
Payer: COMMERCIAL

## 2018-02-27 VITALS
HEART RATE: 92 BPM | WEIGHT: 285 LBS | BODY MASS INDEX: 38.6 KG/M2 | HEIGHT: 72 IN | SYSTOLIC BLOOD PRESSURE: 124 MMHG | RESPIRATION RATE: 15 BRPM | DIASTOLIC BLOOD PRESSURE: 74 MMHG

## 2018-02-27 DIAGNOSIS — C60.9 PENILE CANCER: Primary | ICD-10-CM

## 2018-02-27 PROCEDURE — 99999 PR PBB SHADOW E&M-EST. PATIENT-LVL III: CPT | Mod: PBBFAC,,, | Performed by: UROLOGY

## 2018-02-27 PROCEDURE — 3008F BODY MASS INDEX DOCD: CPT | Mod: S$GLB,,, | Performed by: UROLOGY

## 2018-02-27 PROCEDURE — 99212 OFFICE O/P EST SF 10 MIN: CPT | Mod: S$GLB,,, | Performed by: UROLOGY

## 2018-02-27 NOTE — PROGRESS NOTES
"Clinic Note  2/27/2018      Subjective:         Chief Complaint:   HPI  Aakash Deshpande is a 42 y.o. male  recently diagnosed with penile cancer. Consult from dr. Funk.  Patient had circumcision and penile sparing resection on 8/23/2017 by Dr. Funk.  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 Surgical margin positive for dysplasia but negative for carcinoma. Stage T1a vs T2.  Here with his wife Maria Guadalupe. Works as a . 3 kids, 10,13,16.  Patient relates that he had a "wart" growing for a year and went to get it checked.  Reviewed CT scan from 8/23/2017. No inguinal or pelvic lymphadenopathy.  [facility].  Stage T1a moderate differentiated 2/4, no lvi.     CT scan today- no inguinal or pelvic nodes to my review or radiology review.    Pathology from 2/5/18 biopsy- differentiated PIN.    No past medical history on file.  Family History   Problem Relation Age of Onset    Hypertension Father     Diabetes Mother     Thyroid nodules Mother      Social History     Social History    Marital status:      Spouse name: N/A    Number of children: N/A    Years of education: N/A     Occupational History    Not on file.     Social History Main Topics    Smoking status: Former Smoker    Smokeless tobacco: Current User     Types: Chew    Alcohol use Yes    Drug use: No    Sexual activity: Not on file     Other Topics Concern    Not on file     Social History Narrative    No narrative on file     Past Surgical History:   Procedure Laterality Date    BACK SURGERY  03/2016     Patient Active Problem List   Diagnosis    Penile cancer     Review of Systems   Constitutional: Negative for appetite change, chills, fatigue, fever and unexpected weight change.   HENT: Negative for nosebleeds.    Respiratory: Negative for shortness of breath and wheezing.    Cardiovascular: Negative for chest pain, palpitations and leg " "swelling.   Gastrointestinal: Negative for abdominal distention, abdominal pain, constipation, diarrhea, nausea and vomiting.   Genitourinary: Negative for dysuria and hematuria.   Musculoskeletal: Negative for arthralgias and back pain.   Skin: Negative for pallor.   Neurological: Negative for dizziness, seizures and syncope.   Hematological: Negative for adenopathy.   Psychiatric/Behavioral: Negative for dysphoric mood.         Objective:      There were no vitals taken for this visit.  Estimated body mass index is 38.78 kg/m² as calculated from the following:    Height as of 2/5/18: 5' 11.5" (1.816 m).    Weight as of 2/5/18: 127.9 kg (282 lb).  Physical Exam   Genitourinary:   Genitourinary Comments: Healing well, no erythema         Assessment and Plan:           Problem List Items Addressed This Visit     Penile cancer - Primary          Follow up:     Discussed management options for recurrence of differentiated PEIN. F/U in 2 months.  Gray Yousif      "

## 2018-02-27 NOTE — PHYSICIAN QUERY
PT Name: Aakash Deshpande  MR #: [id number]     Physician Query Form - Documentation Clarification      CDS/: Kathy Pitts               Contact information:    This form is a permanent document in the medical record.     Query Date: February 27, 2018    By submitting this query, we are merely seeking further clarification of documentation. Please utilize your independent clinical judgment when addressing the question(s) below.    The Medical record reflects the following:    Supporting Clinical Findings Location in Medical Record     Excision penile lesion       OP Note                                                                                      Doctor Nica,  Please specify the size of the penile lesion excised. associated with above clinical findings.    Provider Use Only    [ ] 0.5 cm or less  [x 0.6-1.0 cm  [ ] 1.1-2.0 cm  [ ] 2.1-3.0 cm  [ ] 3.1-4.0 cm  [ ] over 4.0 cm                                                                                                                           [  ] Clinically undetermined

## 2018-05-01 ENCOUNTER — OFFICE VISIT (OUTPATIENT)
Dept: UROLOGY | Facility: CLINIC | Age: 43
End: 2018-05-01
Payer: COMMERCIAL

## 2018-05-01 VITALS
RESPIRATION RATE: 16 BRPM | HEART RATE: 79 BPM | BODY MASS INDEX: 38.47 KG/M2 | WEIGHT: 284 LBS | SYSTOLIC BLOOD PRESSURE: 120 MMHG | HEIGHT: 72 IN | DIASTOLIC BLOOD PRESSURE: 81 MMHG

## 2018-05-01 DIAGNOSIS — C60.9 PENILE CANCER: Primary | ICD-10-CM

## 2018-05-01 PROCEDURE — 99999 PR PBB SHADOW E&M-EST. PATIENT-LVL III: CPT | Mod: PBBFAC,,, | Performed by: UROLOGY

## 2018-05-01 PROCEDURE — 99214 OFFICE O/P EST MOD 30 MIN: CPT | Mod: S$GLB,,, | Performed by: UROLOGY

## 2018-05-01 RX ORDER — FLUOROURACIL 50 MG/G
CREAM TOPICAL EVERY OTHER DAY
Qty: 40 G | Refills: 0 | Status: SHIPPED | OUTPATIENT
Start: 2018-05-01

## 2018-05-01 NOTE — PROGRESS NOTES
"Clinic Note  5/1/2018      Subjective:         Chief Complaint:   HPI  Aakash Deshpande is a 42 y.o. male recently diagnosed with penile cancer. Consult from Dr. Funk.  Patient had circumcision and penile sparing resection on 8/23/2017 by Dr. Funk.  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 Surgical margin positive for dysplasia but negative for carcinoma. Stage T1a vs T2.  Here with his wife Maria Guadalupe. Works as a . 3 kids, 10,13,16.  Patient relates that he had a "wart" growing for a year and went to get it checked.  Reviewed CT scan from 8/23/2017. No inguinal or pelvic lymphadenopathy.  [facility] slides [facility] conference.  Stage T1a moderate differentiated 2/4, no lvi.     CT scan today- no inguinal or pelvic nodes to my review or radiology review.     Pathology from 2/5/18 biopsy- differentiated PeIN.    No past medical history on file.  Family History   Problem Relation Age of Onset    Hypertension Father     Diabetes Mother     Thyroid nodules Mother      Social History     Social History    Marital status:      Spouse name: N/A    Number of children: N/A    Years of education: N/A     Occupational History    Not on file.     Social History Main Topics    Smoking status: Former Smoker    Smokeless tobacco: Current User     Types: Chew    Alcohol use Yes    Drug use: No    Sexual activity: Not on file     Other Topics Concern    Not on file     Social History Narrative    No narrative on file     Past Surgical History:   Procedure Laterality Date    BACK SURGERY  03/2016     Patient Active Problem List   Diagnosis    Penile cancer     Review of Systems   Constitutional: Negative for appetite change, chills, fatigue, fever and unexpected weight change.   HENT: Negative for nosebleeds.    Respiratory: Negative for shortness of breath and wheezing.    Cardiovascular: Negative for chest pain, palpitations and leg " "swelling.   Gastrointestinal: Negative for abdominal distention, abdominal pain, constipation, diarrhea, nausea and vomiting.   Genitourinary: Negative for dysuria and hematuria.   Musculoskeletal: Negative for arthralgias and back pain.   Skin: Negative for pallor.   Neurological: Negative for dizziness, seizures and syncope.   Hematological: Negative for adenopathy.   Psychiatric/Behavioral: Negative for dysphoric mood.         Objective:      There were no vitals taken for this visit.  Estimated body mass index is 39.2 kg/m² as calculated from the following:    Height as of 2/27/18: 5' 11.5" (1.816 m).    Weight as of 2/27/18: 129.3 kg (285 lb).  Physical Exam   Genitourinary:         Genitourinary Comments: erythema         Assessment and Plan:           Problem List Items Addressed This Visit     Penile cancer - Primary          Follow up:     Discussed options- surgical resection, laser, 5 FU cream,.  F/U 2 months.  Gray Yousif      "

## 2018-05-02 ENCOUNTER — PATIENT MESSAGE (OUTPATIENT)
Dept: UROLOGY | Facility: CLINIC | Age: 43
End: 2018-05-02

## 2018-08-09 ENCOUNTER — OFFICE VISIT (OUTPATIENT)
Dept: UROLOGY | Facility: CLINIC | Age: 43
End: 2018-08-09
Payer: COMMERCIAL

## 2018-08-09 VITALS
SYSTOLIC BLOOD PRESSURE: 138 MMHG | DIASTOLIC BLOOD PRESSURE: 87 MMHG | BODY MASS INDEX: 38.19 KG/M2 | HEIGHT: 72 IN | HEART RATE: 87 BPM | RESPIRATION RATE: 15 BRPM | WEIGHT: 282 LBS

## 2018-08-09 DIAGNOSIS — C60.9 PENILE CANCER: Primary | ICD-10-CM

## 2018-08-09 PROCEDURE — 99213 OFFICE O/P EST LOW 20 MIN: CPT | Mod: S$GLB,,, | Performed by: UROLOGY

## 2018-08-09 PROCEDURE — 3008F BODY MASS INDEX DOCD: CPT | Mod: CPTII,S$GLB,, | Performed by: UROLOGY

## 2018-08-09 PROCEDURE — 99999 PR PBB SHADOW E&M-EST. PATIENT-LVL III: CPT | Mod: PBBFAC,,, | Performed by: UROLOGY

## 2018-08-09 NOTE — PROGRESS NOTES
"Clinic Note  8/9/2018      Subjective:         Chief Complaint:   HPI  Aakash Deshpande is a 42 y.o. male diagnosed with penile cancer. Consult from Dr. Funk.  Patient had circumcision and penile sparing resection on 8/23/2017 by Dr. Funk.  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 Surgical margin positive for dysplasia but negative for carcinoma. Stage T1a vs T2.  Here with his wife Maria Guadalupe. Works as a . 3 kids, 10,13,16.  Patient relates that he had a "wart" growing for a year and went to get it checked.  Reviewed CT scan from 8/23/2017. No inguinal or pelvic lymphadenopathy.  [facility].  Stage T1a moderate differentiated 2/4, no lvi.     CT scan today- no inguinal or pelvic nodes to my review or radiology review.     Pathology from 2/5/18 biopsy- differentiated PeIN.Has been using 5 FU cream. Overall he felt he responded well to cream.    Past Medical History:   Diagnosis Date    Penile cancer      Family History   Problem Relation Age of Onset    Hypertension Father     Diabetes Mother     Thyroid nodules Mother      Social History     Social History    Marital status:      Spouse name: N/A    Number of children: N/A    Years of education: N/A     Occupational History    Not on file.     Social History Main Topics    Smoking status: Former Smoker    Smokeless tobacco: Current User     Types: Chew    Alcohol use Yes    Drug use: No    Sexual activity: No     Other Topics Concern    Not on file     Social History Narrative    No narrative on file     Past Surgical History:   Procedure Laterality Date    BACK SURGERY  03/2016    PENIS SURGERY       Patient Active Problem List   Diagnosis    Penile cancer     Review of Systems   Constitutional: Negative for appetite change, chills, fatigue, fever and unexpected weight change.   HENT: Negative for nosebleeds.    Respiratory: Negative for " "shortness of breath and wheezing.    Cardiovascular: Negative for chest pain, palpitations and leg swelling.   Gastrointestinal: Negative for abdominal distention, abdominal pain, constipation, diarrhea, nausea and vomiting.   Genitourinary: Negative for dysuria and hematuria.   Musculoskeletal: Negative for arthralgias and back pain.   Skin: Negative for pallor.   Neurological: Negative for dizziness, seizures and syncope.   Hematological: Negative for adenopathy.   Psychiatric/Behavioral: Negative for dysphoric mood.         Objective:      There were no vitals taken for this visit.  Estimated body mass index is 39.06 kg/m² as calculated from the following:    Height as of 5/1/18: 5' 11.5" (1.816 m).    Weight as of 5/1/18: 128.8 kg (284 lb).  Physical Exam   Constitutional: He is oriented to person, place, and time. He appears well-developed and well-nourished. No distress.   HENT:   Head: Atraumatic.   Neck: No tracheal deviation present.   Cardiovascular: Normal rate.    Pulmonary/Chest: Effort normal. No respiratory distress. He has no wheezes.   Abdominal: Soft. Bowel sounds are normal. He exhibits no distension and no mass. There is no tenderness. There is no rebound and no guarding.   Genitourinary: Penis normal.   Genitourinary Comments: Overall much improved.   Neurological: He is alert and oriented to person, place, and time.   Skin: Skin is warm and dry. He is not diaphoretic.     Psychiatric: He has a normal mood and affect. His behavior is normal. Judgment and thought content normal.         Assessment and Plan:           Problem List Items Addressed This Visit     Penile cancer - Primary          Follow up:   F/U 3 months for exam.    Gray Yousif      "

## 2018-11-08 ENCOUNTER — OFFICE VISIT (OUTPATIENT)
Dept: UROLOGY | Facility: CLINIC | Age: 43
End: 2018-11-08
Payer: COMMERCIAL

## 2018-11-08 VITALS
BODY MASS INDEX: 38.81 KG/M2 | SYSTOLIC BLOOD PRESSURE: 111 MMHG | HEART RATE: 85 BPM | DIASTOLIC BLOOD PRESSURE: 54 MMHG | WEIGHT: 282.19 LBS

## 2018-11-08 DIAGNOSIS — C60.9 PENILE CANCER: Primary | ICD-10-CM

## 2018-11-08 PROCEDURE — 99213 OFFICE O/P EST LOW 20 MIN: CPT | Mod: S$GLB,,, | Performed by: UROLOGY

## 2018-11-08 PROCEDURE — 3008F BODY MASS INDEX DOCD: CPT | Mod: CPTII,S$GLB,, | Performed by: UROLOGY

## 2018-11-08 PROCEDURE — 99999 PR PBB SHADOW E&M-EST. PATIENT-LVL III: CPT | Mod: PBBFAC,,, | Performed by: UROLOGY

## 2018-11-08 RX ORDER — NABUMETONE 750 MG/1
TABLET, FILM COATED ORAL
Refills: 3 | COMMUNITY
Start: 2018-11-02

## 2018-11-08 NOTE — PROGRESS NOTES
"Clinic Note  11/8/2018      Subjective:         Chief Complaint:   HPI  Aakash Deshpande Jr. is a 42 y.o. male diagnosed with penile cancer. Consult from Dr. Funk.  Patient had circumcision and penile sparing resection on 8/23/2017 by Dr. Funk.  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 Surgical margin positive for dysplasia but negative for carcinoma. Stage T1a vs T2.  Here with his wife Maria Guadalupe. Works as a . 3 kids, 10,13,16.  Patient relates that he had a "wart" growing for a year and went to get it checked.  Reviewed CT scan from 8/23/2017. No inguinal or pelvic lymphadenopathy.  [facility].  Stage T1a moderate differentiated 2/4, no lvi.     CT scan today- no inguinal or pelvic nodes to my review or radiology review.     Pathology from 2/5/18 biopsy- differentiated PeIN.Has been using 5 FU cream. Overall he felt he responded well to cream    Past Medical History:   Diagnosis Date    Penile cancer      Family History   Problem Relation Age of Onset    Hypertension Father     Diabetes Mother     Thyroid nodules Mother      Social History     Socioeconomic History    Marital status:      Spouse name: Not on file    Number of children: Not on file    Years of education: Not on file    Highest education level: Not on file   Social Needs    Financial resource strain: Not on file    Food insecurity - worry: Not on file    Food insecurity - inability: Not on file    Transportation needs - medical: Not on file    Transportation needs - non-medical: Not on file   Occupational History    Not on file   Tobacco Use    Smoking status: Former Smoker    Smokeless tobacco: Current User     Types: Chew   Substance and Sexual Activity    Alcohol use: Yes    Drug use: No    Sexual activity: No   Other Topics Concern    Not on file   Social History Narrative    Not on file     Past Surgical History: " "  Procedure Laterality Date    BACK SURGERY  03/2016    BIOPSY-PENIS N/A 2/5/2018    Performed by [facility]if [facility] at [facility] OR [address] [place] [address]    PENIS SURGERY       Patient Active Problem List   Diagnosis    Penile cancer     Review of Systems   Constitutional: Negative for appetite change, chills, fatigue, fever and unexpected weight change.   HENT: Negative for nosebleeds.    Respiratory: Negative for shortness of breath and wheezing.    Cardiovascular: Negative for chest pain, palpitations and leg swelling.   Gastrointestinal: Negative for abdominal distention, abdominal pain, constipation, diarrhea, nausea and vomiting.   Genitourinary: Negative for dysuria and hematuria.   Musculoskeletal: Negative for arthralgias and back pain.   Skin: Negative for pallor.   Neurological: Negative for dizziness, seizures and syncope.   Hematological: Negative for adenopathy.   Psychiatric/Behavioral: Negative for dysphoric mood.         Objective:      BP (!) 111/54   Pulse 85   Wt 128 kg (282 lb 3 oz)   BMI 38.81 kg/m²   Estimated body mass index is 38.81 kg/m² as calculated from the following:    Height as of 8/9/18: 5' 11.5" (1.816 m).    Weight as of this encounter: 128 kg (282 lb 3 oz).  Physical Exam   Constitutional: He is oriented to person, place, and time. He appears well-developed and well-nourished. No distress.   HENT:   Head: Atraumatic.   Neck: No tracheal deviation present.   Cardiovascular: Normal rate.    Pulmonary/Chest: Effort normal. No respiratory distress. He has no wheezes.   Abdominal: Soft. Bowel sounds are normal. He exhibits no distension and no mass. There is no tenderness. There is no rebound and no guarding.   Genitourinary: Circumcised.   Genitourinary Comments: Stable to improved   Neurological: He is alert and oriented to person, place, and time.   Skin: Skin is warm and dry. He is not diaphoretic.     Psychiatric: He has a normal mood and affect. His behavior is normal. Judgment and " thought content normal.         Assessment and Plan:           Problem List Items Addressed This Visit     Penile cancer - Primary          Follow up:   One month 5FU cream.  3 months for F/U.    Gray Yousif

## 2019-02-07 ENCOUNTER — OFFICE VISIT (OUTPATIENT)
Dept: UROLOGY | Facility: CLINIC | Age: 44
End: 2019-02-07
Payer: COMMERCIAL

## 2019-02-07 VITALS
DIASTOLIC BLOOD PRESSURE: 80 MMHG | HEART RATE: 87 BPM | SYSTOLIC BLOOD PRESSURE: 120 MMHG | HEIGHT: 72 IN | BODY MASS INDEX: 38.07 KG/M2 | WEIGHT: 281.06 LBS

## 2019-02-07 DIAGNOSIS — C60.9 PENILE CANCER: Primary | ICD-10-CM

## 2019-02-07 PROCEDURE — 99214 PR OFFICE/OUTPT VISIT, EST, LEVL IV, 30-39 MIN: ICD-10-PCS | Mod: S$GLB,,, | Performed by: UROLOGY

## 2019-02-07 PROCEDURE — 99999 PR PBB SHADOW E&M-EST. PATIENT-LVL III: ICD-10-PCS | Mod: PBBFAC,,, | Performed by: UROLOGY

## 2019-02-07 PROCEDURE — 3008F PR BODY MASS INDEX (BMI) DOCUMENTED: ICD-10-PCS | Mod: CPTII,S$GLB,, | Performed by: UROLOGY

## 2019-02-07 PROCEDURE — 99214 OFFICE O/P EST MOD 30 MIN: CPT | Mod: S$GLB,,, | Performed by: UROLOGY

## 2019-02-07 PROCEDURE — 99999 PR PBB SHADOW E&M-EST. PATIENT-LVL III: CPT | Mod: PBBFAC,,, | Performed by: UROLOGY

## 2019-02-07 PROCEDURE — 3008F BODY MASS INDEX DOCD: CPT | Mod: CPTII,S$GLB,, | Performed by: UROLOGY

## 2019-02-07 NOTE — PROGRESS NOTES
"Clinic Note  2/7/2019      Subjective:         Chief Complaint:   HPI  Aakash Deshpande Jr. is a 43 y.o. male diagnosed with penile cancer. Consult from Dr. Funk.  Patient had circumcision and penile sparing resection on 8/23/2017 by Dr. Funk.  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 Surgical margin positive for dysplasia but negative for carcinoma. Stage T1a vs T2.  Here with his wife Maria Guadalupe. Works as a . 3 kids, 10,13,16.  Patient relates that he had a "wart" growing for a year and went to get it checked.  Reviewed CT scan from 8/23/2017. No inguinal or pelvic lymphadenopathy.  Path slides reviewed at [facility] multidisciplinary conference.  Stage T1a moderate differentiated 2/4, no lvi.     CT scan today- no inguinal or pelvic nodes to my review or radiology review.     Pathology from 2/5/18 biopsy- differentiated PeIN.Has been using 5 FU cream. Overall he felt he responded well to cream       Past Medical History:   Diagnosis Date    Penile cancer      Family History   Problem Relation Age of Onset    Hypertension Father     Diabetes Mother     Thyroid nodules Mother      Social History     Socioeconomic History    Marital status:      Spouse name: Not on file    Number of children: Not on file    Years of education: Not on file    Highest education level: Not on file   Social Needs    Financial resource strain: Not on file    Food insecurity - worry: Not on file    Food insecurity - inability: Not on file    Transportation needs - medical: Not on file    Transportation needs - non-medical: Not on file   Occupational History    Not on file   Tobacco Use    Smoking status: Former Smoker    Smokeless tobacco: Current User     Types: Chew   Substance and Sexual Activity    Alcohol use: Yes    Drug use: No    Sexual activity: No   Other Topics Concern    Not on file   Social History Narrative    Not on file     Past Surgical History: "   Procedure Laterality Date    BACK SURGERY  03/2016    BIOPSY-PENIS N/A 2/5/2018    Performed by Gray Yousif MD at [facility] OR [address] [place] [address]    PENIS SURGERY       Patient Active Problem List   Diagnosis    Penile cancer     Review of Systems   Constitutional: Negative for appetite change, chills, fatigue, fever and unexpected weight change.   HENT: Positive for dental problem. Negative for nosebleeds.    Respiratory: Negative for shortness of breath and wheezing.    Cardiovascular: Negative for chest pain, palpitations and leg swelling.   Gastrointestinal: Negative for abdominal distention, abdominal pain, constipation, diarrhea, nausea and vomiting.   Genitourinary: Negative for dysuria, hematuria and penile pain.   Musculoskeletal: Negative for arthralgias and back pain.   Skin: Negative for pallor.   Neurological: Negative for dizziness, seizures and syncope.   Hematological: Negative for adenopathy.   Psychiatric/Behavioral: Negative for dysphoric mood.         Objective:      /80   Pulse 87   Ht 6' (1.829 m)   Wt 127.5 kg (281 lb 1.4 oz)   BMI 38.12 kg/m²   Estimated body mass index is 38.12 kg/m² as calculated from the following:    Height as of this encounter: 6' (1.829 m).    Weight as of this encounter: 127.5 kg (281 lb 1.4 oz).  Physical Exam   Constitutional: He is oriented to person, place, and time. He appears well-developed and well-nourished. No distress.   HENT:   Head: Atraumatic.   Neck: No tracheal deviation present.   Cardiovascular: Normal rate.    Pulmonary/Chest: Effort normal. No respiratory distress. He has no wheezes.   Abdominal: Soft. Bowel sounds are normal. He exhibits no distension and no mass. There is no tenderness. There is no rebound and no guarding.   Genitourinary: Testes normal.         Genitourinary Comments: Slight erythema, no induration   Lymphadenopathy: No inguinal adenopathy noted on the right or left side.   Neurological: He is alert and oriented to person,  place, and time.   Skin: Skin is warm and dry. He is not diaphoretic.     Psychiatric: He has a normal mood and affect. His behavior is normal. Judgment and thought content normal.         Assessment and Plan:           Problem List Items Addressed This Visit     Penile cancer - Primary          Follow up:   3 months with surveillance    Gray Yousif

## 2019-05-21 ENCOUNTER — OFFICE VISIT (OUTPATIENT)
Dept: UROLOGY | Facility: CLINIC | Age: 44
End: 2019-05-21
Payer: COMMERCIAL

## 2019-05-21 VITALS
DIASTOLIC BLOOD PRESSURE: 86 MMHG | HEART RATE: 89 BPM | SYSTOLIC BLOOD PRESSURE: 114 MMHG | BODY MASS INDEX: 38.06 KG/M2 | HEIGHT: 72 IN | RESPIRATION RATE: 15 BRPM | WEIGHT: 281 LBS

## 2019-05-21 DIAGNOSIS — C60.9 PENILE CANCER: Primary | ICD-10-CM

## 2019-05-21 PROCEDURE — 99214 OFFICE O/P EST MOD 30 MIN: CPT | Mod: S$GLB,,, | Performed by: UROLOGY

## 2019-05-21 PROCEDURE — 99214 PR OFFICE/OUTPT VISIT, EST, LEVL IV, 30-39 MIN: ICD-10-PCS | Mod: S$GLB,,, | Performed by: UROLOGY

## 2019-05-21 PROCEDURE — 99999 PR PBB SHADOW E&M-EST. PATIENT-LVL III: ICD-10-PCS | Mod: PBBFAC,,, | Performed by: UROLOGY

## 2019-05-21 PROCEDURE — 3008F BODY MASS INDEX DOCD: CPT | Mod: CPTII,S$GLB,, | Performed by: UROLOGY

## 2019-05-21 PROCEDURE — 3008F PR BODY MASS INDEX (BMI) DOCUMENTED: ICD-10-PCS | Mod: CPTII,S$GLB,, | Performed by: UROLOGY

## 2019-05-21 PROCEDURE — 99999 PR PBB SHADOW E&M-EST. PATIENT-LVL III: CPT | Mod: PBBFAC,,, | Performed by: UROLOGY

## 2019-05-21 NOTE — PROGRESS NOTES
"Clinic Note  5/21/2019      Subjective:         Chief Complaint:   HPI  Aakash Deshpande Jr. is a 43 y.o. male diagnosed with penile cancer. Consult from Dr. Funk.  Patient had circumcision and penile sparing resection on 8/23/2017 by Dr. Funk.  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 Surgical margin positive for dysplasia but negative for carcinoma. Stage T1a vs T2.  Here with his wife Maria Guadalupe. Works as a . 3 kids, 10,13,16.  Patient relates that he had a "wart" growing for a year and went to get it checked.  Reviewed CT scan from 8/23/2017. No inguinal or pelvic lymphadenopathy.  Path slides reviewed at [facility] multidisciplinary conference.  Stage T1a moderate differentiated 2/4, no lvi.     CT scan today- no inguinal or pelvic nodes to my review or radiology review.     Pathology from 2/5/18 biopsy- differentiated PeIN.Has been using 5 FU cream. Overall he felt he responded well to cream       Past Medical History:   Diagnosis Date    Penile cancer      Family History   Problem Relation Age of Onset    Hypertension Father     Diabetes Mother     Thyroid nodules Mother      Social History     Socioeconomic History    Marital status:      Spouse name: Not on file    Number of children: Not on file    Years of education: Not on file    Highest education level: Not on file   Occupational History    Not on file   Social Needs    Financial resource strain: Not on file    Food insecurity:     Worry: Not on file     Inability: Not on file    Transportation needs:     Medical: Not on file     Non-medical: Not on file   Tobacco Use    Smoking status: Former Smoker    Smokeless tobacco: Current User     Types: Chew   Substance and Sexual Activity    Alcohol use: Yes    Drug use: No    Sexual activity: Never   Lifestyle    Physical activity:     Days per week: Not on file     Minutes per session: Not on file    Stress: Not on file "   Relationships    Social connections:     Talks on phone: Not on file     Gets together: Not on file     Attends Congregational service: Not on file     Active member of club or organization: Not on file     Attends meetings of clubs or organizations: Not on file     Relationship status: Not on file   Other Topics Concern    Not on file   Social History Narrative    Not on file     Past Surgical History:   Procedure Laterality Date    BACK SURGERY  03/2016    BIOPSY-PENIS N/A 2/5/2018    Performed by [facility]if [facility] at [facility] OR [address] [place] [address]    PENIS SURGERY       Patient Active Problem List   Diagnosis    Penile cancer     Review of Systems   Constitutional: Negative for appetite change, chills, fatigue, fever and unexpected weight change.   HENT: Negative for nosebleeds.    Respiratory: Negative for shortness of breath and wheezing.    Cardiovascular: Negative for chest pain, palpitations and leg swelling.   Gastrointestinal: Negative for abdominal distention, abdominal pain, constipation, diarrhea, nausea and vomiting.   Genitourinary: Negative for dysuria and hematuria.   Musculoskeletal: Negative for arthralgias and back pain.   Skin: Negative for pallor.   Neurological: Negative for dizziness, seizures and syncope.   Hematological: Negative for adenopathy.   Psychiatric/Behavioral: Negative for dysphoric mood.         Objective:      /86   Pulse 89   Resp 15   Ht 6' (1.829 m)   Wt 127.5 kg (281 lb)   BMI 38.11 kg/m²   Estimated body mass index is 38.11 kg/m² as calculated from the following:    Height as of this encounter: 6' (1.829 m).    Weight as of this encounter: 127.5 kg (281 lb).  Physical Exam   Constitutional: He is oriented to person, place, and time. He appears well-developed and well-nourished. No distress.   HENT:   Head: Atraumatic.   Neck: No tracheal deviation present.   Cardiovascular: Normal rate.    Pulmonary/Chest: Effort normal. No respiratory distress. He has no wheezes.    Abdominal: Soft. Bowel sounds are normal. He exhibits no distension and no mass. There is no tenderness. There is no rebound and no guarding.   Genitourinary: Penis normal. No phimosis or penile erythema.   Neurological: He is alert and oriented to person, place, and time.   Skin: Skin is warm and dry. He is not diaphoretic.     Psychiatric: He has a normal mood and affect. His behavior is normal. Judgment and thought content normal.         Assessment and Plan:           Problem List Items Addressed This Visit     Penile cancer - Primary          Follow up:   Improved physical exam.  6 months.    Gray Yousif

## 2019-11-21 ENCOUNTER — OFFICE VISIT (OUTPATIENT)
Dept: UROLOGY | Facility: CLINIC | Age: 44
End: 2019-11-21
Payer: COMMERCIAL

## 2019-11-21 VITALS
DIASTOLIC BLOOD PRESSURE: 71 MMHG | HEIGHT: 72 IN | SYSTOLIC BLOOD PRESSURE: 131 MMHG | WEIGHT: 257 LBS | BODY MASS INDEX: 34.81 KG/M2 | HEART RATE: 71 BPM

## 2019-11-21 DIAGNOSIS — D49.59 PENILE INTRAEPITHELIAL NEOPLASIA: ICD-10-CM

## 2019-11-21 DIAGNOSIS — C60.9 PENILE CANCER: Primary | ICD-10-CM

## 2019-11-21 DIAGNOSIS — Z98.890 HISTORY OF CIRCUMCISION: ICD-10-CM

## 2019-11-21 PROCEDURE — 99999 PR PBB SHADOW E&M-EST. PATIENT-LVL III: CPT | Mod: PBBFAC,,, | Performed by: NURSE PRACTITIONER

## 2019-11-21 PROCEDURE — 99999 PR PBB SHADOW E&M-EST. PATIENT-LVL III: ICD-10-PCS | Mod: PBBFAC,,, | Performed by: NURSE PRACTITIONER

## 2019-11-21 PROCEDURE — 3008F BODY MASS INDEX DOCD: CPT | Mod: CPTII,S$GLB,, | Performed by: NURSE PRACTITIONER

## 2019-11-21 PROCEDURE — 99214 PR OFFICE/OUTPT VISIT, EST, LEVL IV, 30-39 MIN: ICD-10-PCS | Mod: S$GLB,,, | Performed by: NURSE PRACTITIONER

## 2019-11-21 PROCEDURE — 99214 OFFICE O/P EST MOD 30 MIN: CPT | Mod: S$GLB,,, | Performed by: NURSE PRACTITIONER

## 2019-11-21 PROCEDURE — 3008F PR BODY MASS INDEX (BMI) DOCUMENTED: ICD-10-PCS | Mod: CPTII,S$GLB,, | Performed by: NURSE PRACTITIONER

## 2019-11-21 NOTE — PROGRESS NOTES
"Subjective:       Patient ID: Aakash Deshpande Jr. is a 44 y.o. male.    Chief Complaint: Penile Cancer    Aakash Deshpande Jr. is a 44 y.o. male diagnosed with penile cancer. Consult from Dr. Funk.  Patient had circumcision and penile sparing resection on 8/23/2017 by Dr. Funk.  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   Surgical margin positive for dysplasia but negative for carcinoma. Stage T1a vs T2.  Patient relates that he had a "wart" growing for a year and went to get it checked.  Reviewed CT scan from 8/23/2017. No inguinal or pelvic lymphadenopathy.  Path slides [facility] conference.  Stage T1a moderate differentiated 2/4, no lvi.    Here with his wife Maria Guadalupe. Works as a . 3 kids, 11,14,17.    Pathology from 2/5/18 biopsy- differentiated PeIN.Has been using 5 FU cream. Overall he felt he responded well to cream    CTAP 01/09/2018 did not show any metastatic dz;    Today he voices no complaints.  He reports that a whitish raised area showed up but went away.  No pain or discomfort.  He not using any creams.          Past Medical History:  No date: Penile cancer    Past Surgical History:  03/2016: BACK SURGERY  No date: PENIS SURGERY    Review of patient's family history indicates:  Problem: Hypertension      Relation: Father          Age of Onset: (Not Specified)  Problem: Diabetes      Relation: Mother          Age of Onset: (Not Specified)  Problem: Thyroid nodules      Relation: Mother          Age of Onset: (Not Specified)      Social History    Socioeconomic History      Marital status:       Spouse name: Not on file      Number of children: Not on file      Years of education: Not on file      Highest education level: Not on file    Occupational History      Not on file    Social Needs      Financial resource strain: Not on file      Food insecurity:        Worry: Not on file        Inability: Not on file      " Transportation needs:        Medical: Not on file        Non-medical: Not on file    Tobacco Use      Smoking status: Former Smoker      Smokeless tobacco: Current User        Types: Chew    Substance and Sexual Activity      Alcohol use: Yes      Drug use: No      Sexual activity: Never    Lifestyle      Physical activity:        Days per week: Not on file        Minutes per session: Not on file      Stress: Not on file    Relationships      Social connections:        Talks on phone: Not on file        Gets together: Not on file        Attends Shinto service: Not on file        Active member of club or organization: Not on file        Attends meetings of clubs or organizations: Not on file        Relationship status: Not on file    Other Topics      Concerns:        Not on file    Social History Narrative      Not on file      Allergies:  Patient has no known allergies.    Medications:  Current Outpatient Medications:   fluorouracil (EFUDEX) 5 % cream, Apply topically every other day., Disp: 40 g, Rfl: 0  multivitamin (ONE DAILY MULTIVITAMIN) per tablet, Take 1 tablet by mouth once daily., Disp: , Rfl:   nabumetone (RELAFEN) 750 MG tablet, TK 1 T PO BID, Disp: , Rfl: 3        Review of Systems   Constitutional: Negative for activity change, appetite change, chills and fever.   HENT: Negative for facial swelling and trouble swallowing.    Eyes: Negative for visual disturbance.   Respiratory: Negative for chest tightness and shortness of breath.    Cardiovascular: Negative for chest pain and palpitations.   Gastrointestinal: Negative.  Negative for abdominal pain, constipation, diarrhea, nausea and vomiting.   Genitourinary: Negative for difficulty urinating, dysuria, flank pain, hematuria, penile pain, penile swelling, scrotal swelling and testicular pain.   Musculoskeletal: Negative for back pain, gait problem, myalgias and neck stiffness.   Skin: Negative for rash.   Neurological: Negative for dizziness and  speech difficulty.   Hematological: Does not bruise/bleed easily.   Psychiatric/Behavioral: Negative for behavioral problems.       Objective:      Physical Exam   Nursing note and vitals reviewed.  Constitutional: He is oriented to person, place, and time. Vital signs are normal. He appears well-developed and well-nourished. He is cooperative.  Non-toxic appearance. He does not have a sickly appearance.   HENT:   Head: Normocephalic and atraumatic.   Right Ear: External ear normal.   Left Ear: External ear normal.   Nose: Nose normal.   Mouth/Throat: Mucous membranes are normal.   Eyes: Conjunctivae and lids are normal. No scleral icterus.   Neck: Trachea normal, normal range of motion and full passive range of motion without pain. Neck supple. No JVD present. No tracheal deviation present.   Cardiovascular: Normal rate, S1 normal and S2 normal.    Pulmonary/Chest: Effort normal. No respiratory distress. He exhibits no tenderness.   Abdominal: Soft. Normal appearance. There is no hepatosplenomegaly. There is no tenderness. There is no CVA tenderness.   Genitourinary: Testes normal. Circumcised. Penile erythema present. No hypospadias or penile tenderness. No discharge found.         Musculoskeletal: Normal range of motion.   Lymphadenopathy: No inguinal adenopathy noted on the right or left side.        Right: No inguinal and no supraclavicular adenopathy present.        Left: No inguinal and no supraclavicular adenopathy present.   Neurological: He is alert and oriented to person, place, and time. He has normal strength.   Skin: Skin is warm, dry and intact.     Psychiatric: He has a normal mood and affect. His behavior is normal. Judgment and thought content normal.       Assessment:       1. Penile cancer    2. History of circumcision    3. Penile intraepithelial neoplasia        Plan:         I spent 25 minutes with the patient of which more than half was spent in direct consultation with the patient in regards  to our treatment and plan.    Education and recommendations of today's plan of care including home remedies.  Discussed today's  findings and exam.  Recommend only using non steriodal barrier cream  Penile irritation from sweating.   f/u
